# Patient Record
Sex: FEMALE | Race: WHITE | NOT HISPANIC OR LATINO | Employment: FULL TIME | URBAN - METROPOLITAN AREA
[De-identification: names, ages, dates, MRNs, and addresses within clinical notes are randomized per-mention and may not be internally consistent; named-entity substitution may affect disease eponyms.]

---

## 2022-02-15 ENCOUNTER — APPOINTMENT (EMERGENCY)
Dept: RADIOLOGY | Facility: HOSPITAL | Age: 37
End: 2022-02-15
Payer: COMMERCIAL

## 2022-02-15 ENCOUNTER — HOSPITAL ENCOUNTER (EMERGENCY)
Facility: HOSPITAL | Age: 37
Discharge: HOME | End: 2022-02-15
Attending: EMERGENCY MEDICINE
Payer: COMMERCIAL

## 2022-02-15 VITALS
DIASTOLIC BLOOD PRESSURE: 70 MMHG | HEIGHT: 61 IN | SYSTOLIC BLOOD PRESSURE: 112 MMHG | TEMPERATURE: 97.9 F | WEIGHT: 100 LBS | OXYGEN SATURATION: 100 % | RESPIRATION RATE: 16 BRPM | HEART RATE: 82 BPM | BODY MASS INDEX: 18.88 KG/M2

## 2022-02-15 DIAGNOSIS — S52.531A CLOSED COLLES' FRACTURE OF RIGHT RADIUS, INITIAL ENCOUNTER: ICD-10-CM

## 2022-02-15 DIAGNOSIS — S52.501A DISTAL RADIUS FRACTURE, RIGHT: Primary | ICD-10-CM

## 2022-02-15 PROCEDURE — 99283 EMERGENCY DEPT VISIT LOW MDM: CPT

## 2022-02-15 PROCEDURE — 73110 X-RAY EXAM OF WRIST: CPT

## 2022-02-15 PROCEDURE — 25605 CLTX DST RDL FX/EPHYS SEP W/: CPT | Performed by: EMERGENCY MEDICINE

## 2022-02-15 PROCEDURE — 96372 THER/PROPH/DIAG INJ SC/IM: CPT

## 2022-02-15 PROCEDURE — 99284 EMERGENCY DEPT VISIT MOD MDM: CPT | Performed by: EMERGENCY MEDICINE

## 2022-02-15 PROCEDURE — 73100 X-RAY EXAM OF WRIST: CPT

## 2022-02-15 RX ORDER — LIDOCAINE HYDROCHLORIDE 10 MG/ML
20 INJECTION, SOLUTION EPIDURAL; INFILTRATION; INTRACAUDAL; PERINEURAL ONCE
Status: COMPLETED | OUTPATIENT
Start: 2022-02-15 | End: 2022-02-15

## 2022-02-15 RX ORDER — KETOROLAC TROMETHAMINE 30 MG/ML
15 INJECTION, SOLUTION INTRAMUSCULAR; INTRAVENOUS ONCE
Status: COMPLETED | OUTPATIENT
Start: 2022-02-15 | End: 2022-02-15

## 2022-02-15 RX ORDER — OXYCODONE HYDROCHLORIDE 5 MG/1
5 TABLET ORAL ONCE
Status: COMPLETED | OUTPATIENT
Start: 2022-02-15 | End: 2022-02-15

## 2022-02-15 RX ORDER — ACETAMINOPHEN 325 MG/1
975 TABLET ORAL ONCE
Status: COMPLETED | OUTPATIENT
Start: 2022-02-15 | End: 2022-02-15

## 2022-02-15 RX ORDER — OXYCODONE HYDROCHLORIDE 5 MG/1
5 CAPSULE ORAL EVERY 4 HOURS PRN
Qty: 8 CAPSULE | Refills: 0 | Status: SHIPPED | OUTPATIENT
Start: 2022-02-15 | End: 2022-02-18

## 2022-02-15 RX ADMIN — LIDOCAINE HYDROCHLORIDE 20 ML: 10 INJECTION, SOLUTION EPIDURAL; INFILTRATION; INTRACAUDAL at 14:10

## 2022-02-15 RX ADMIN — OXYCODONE HYDROCHLORIDE 5 MG: 5 TABLET ORAL at 13:59

## 2022-02-15 RX ADMIN — KETOROLAC TROMETHAMINE 15 MG: 30 INJECTION, SOLUTION INTRAMUSCULAR at 14:00

## 2022-02-15 RX ADMIN — ACETAMINOPHEN 975 MG: 325 TABLET, FILM COATED ORAL at 13:59

## 2022-02-15 NOTE — ED PROVIDER NOTES
History  Chief Complaint   Patient presents with    Wrist Injury     Patient reports that she fell while ice skating today injuring her right wrist; area is red and swollen; denies head strike or LOC     32 yo LHD F w no pmhx presenting with right wrist pain x 2 hours  She was ice skating when she fell on out stretched hand  She had immediate pain and swelling  No headstrike, no LOC, no thinners  Pain is:  throbbing in character  Located left wrist  acute in onset  constant in duration  severe in intensity  Exacerbating factors movement of wrist  Remitting factors immobilization  Radiates up the arm  Associated symptoms: no numbness or tingling, no open wounds noted        History provided by:  Patient   used: No        None       History reviewed  No pertinent past medical history  Past Surgical History:   Procedure Laterality Date    GALLBLADDER SURGERY         History reviewed  No pertinent family history  I have reviewed and agree with the history as documented  E-Cigarette/Vaping    E-Cigarette Use Never User      E-Cigarette/Vaping Substances     Social History     Tobacco Use    Smoking status: Never Smoker    Smokeless tobacco: Never Used   Vaping Use    Vaping Use: Never used   Substance Use Topics    Alcohol use: Never    Drug use: Never        Review of Systems   Constitutional: Negative  HENT: Negative  Eyes: Negative  Respiratory: Negative  Cardiovascular: Negative  Gastrointestinal: Negative  Endocrine: Negative  Genitourinary: Negative  Musculoskeletal: Positive for joint swelling  Allergic/Immunologic: Negative  Neurological: Negative  Hematological: Negative  Psychiatric/Behavioral: Negative          Physical Exam  ED Triage Vitals [02/15/22 1307]   Temperature Pulse Respirations Blood Pressure SpO2   97 9 °F (36 6 °C) 82 16 112/70 100 %      Temp Source Heart Rate Source Patient Position - Orthostatic VS BP Location FiO2 (%) Tympanic Monitor Sitting Left arm --      Pain Score       10 - Worst Possible Pain             Orthostatic Vital Signs  Vitals:    02/15/22 1307   BP: 112/70   Pulse: 82   Patient Position - Orthostatic VS: Sitting       Physical Exam  Vitals reviewed  Constitutional:       Appearance: Normal appearance  HENT:      Head: Normocephalic and atraumatic  Right Ear: Tympanic membrane, ear canal and external ear normal       Left Ear: Tympanic membrane, ear canal and external ear normal       Nose: Nose normal       Mouth/Throat:      Mouth: Mucous membranes are moist       Pharynx: Oropharynx is clear  Eyes:      Conjunctiva/sclera: Conjunctivae normal       Pupils: Pupils are equal, round, and reactive to light  Cardiovascular:      Rate and Rhythm: Normal rate and regular rhythm  Pulses: Normal pulses  Heart sounds: Normal heart sounds  Pulmonary:      Effort: Pulmonary effort is normal       Breath sounds: Normal breath sounds  Abdominal:      General: Abdomen is flat  Palpations: Abdomen is soft  Musculoskeletal:      Right wrist: Swelling, deformity, tenderness and bony tenderness present  No effusion, lacerations, snuff box tenderness or crepitus  Decreased range of motion  Normal pulse  Cervical back: Normal range of motion and neck supple  Skin:     General: Skin is warm  Capillary Refill: Capillary refill takes less than 2 seconds  Neurological:      General: No focal deficit present  Mental Status: She is alert  Psychiatric:         Mood and Affect: Mood normal          Thought Content:  Thought content normal          ED Medications  Medications - No data to display    Diagnostic Studies  Results Reviewed     None                 XR wrist 3+ views RIGHT    (Results Pending)         Procedures  Procedure- Orthopedics   Heidi Mg 31 y o  female MRN: [id number]  Unit/Bed#: X ray    Procedure: Distal radius reduction and splint application    A hematoma block was given with 20cc of 1% lidocaine without epi  Once adequate anesthesia was obtained a gentle closed reduction maneuver was performed and pt was placed in a well padded sugartong splint  Pt tolerated the procedure well and was neurovascularly intact both pre and post procedure  Post reduction orthogonal x rays showed appropriate reduction of the distal radius with restoration of radial height and volar tilt  Splint application    Date/Time: 2/15/2022 2:10 PM  Performed by: Jessie Hubbard MD  Authorized by: Jessie Hubbard MD   Universal Protocol:  Consent: Verbal consent obtained  Risks and benefits: risks, benefits and alternatives were discussed  Consent given by: patient  Time out: Immediately prior to procedure a "time out" was called to verify the correct patient, procedure, equipment, support staff and site/side marked as required  Timeout called at: 2/15/2022 2:10 PM   Patient understanding: patient states understanding of the procedure being performed  Patient consent: the patient's understanding of the procedure matches consent given  Procedure consent: procedure consent matches procedure scheduled  Relevant documents: relevant documents present and verified  Test results: test results available and properly labeled  Site marked: the operative site was marked  Radiology Images displayed and confirmed  If images not available, report reviewed: imaging studies available  Patient identity confirmed: verbally with patient      Pre-procedure details:     Sensation:  Normal  Procedure details:     Laterality:  Right    Location:  Wrist    Wrist:  R wrist    Splint type:  Sugar tong    Supplies:  Cotton padding, plaster and elastic bandage          ED Course                                       MDM  Number of Diagnoses or Management Options  Distal radius fracture, right: minor  Diagnosis management comments: 32 yo F LHD Female with right distal radius extraarticular fracture   Reduced and splinted in ED  She will f/u in a week here  scrips for analgesics sent to her pharmacy  Amount and/or Complexity of Data Reviewed  Clinical lab tests: reviewed    Patient Progress  Patient progress: improved      Disposition  Final diagnoses:   None     ED Disposition     None      Follow-up Information    None         Patient's Medications    No medications on file     No discharge procedures on file  PDMP Review     None           ED Provider  Attending physically available and evaluated Khushbu Ty ALLEN managed the patient along with the ED Attending      Electronically Signed by         Giana Tran MD  02/15/22 6726

## 2022-02-15 NOTE — RESULT ENCOUNTER NOTE
Called patient and informed of ulnar fracture as well as the distal radius fracture  Pt plans on following up with her orthopedic doctors in her home town  Gave phone number for medical records to get copy of disc

## 2022-02-15 NOTE — ED ATTENDING ATTESTATION
Final Diagnosis:  1  Distal radius fracture, right    2  Closed Colles' fracture of right radius, initial encounter           I, Veronica Ríos MD, saw and evaluated the patient  All available labs and X-rays were ordered by me or the resident and have been reviewed by myself  I discussed the patient with the resident / non-physician and agree with the resident's / non-physician practitioner's findings and plan as documented in the resident's / non-physician practicitioner's note, except where noted  At this point, I agree with the current assessment done in the ED  I was present during key portions of all procedures performed unless otherwise stated  Chief Complaint   Patient presents with    Wrist Injury     Patient reports that she fell while ice skating today injuring her right wrist; area is red and swollen; denies head strike or LOC     This is a 31 y o  female presenting for evaluation of radial fracture  The patient states that she was ice skating today when she fell on her outstretched hand, she is having severe radial head tenderness since then  No numbness or tingling distally  No head strike, remembers all the events  No medications tried prior to arrival    PMH:   has no past medical history on file  PSH:   has a past surgical history that includes Gallbladder surgery  Social:  Social History     Substance and Sexual Activity   Alcohol Use Never     Social History     Tobacco Use   Smoking Status Never Smoker   Smokeless Tobacco Never Used     Social History     Substance and Sexual Activity   Drug Use Never     PE:  Vitals:    02/15/22 1307   BP: 112/70   BP Location: Left arm   Pulse: 82   Resp: 16   Temp: 97 9 °F (36 6 °C)   TempSrc: Tympanic   SpO2: 100%   Weight: 45 4 kg (100 lb)   Height: 5' 1" (1 549 m)   General: VS reviewed  Appears in NAD  awake, alert  Well-nourished, well-developed  Appears stated age  Speaking normally in full sentences     Head: Normocephalic, atraumatic  Eyes: EOM-I  No diplopia  No hyphema  No subconjunctival hemorrhages  Symmetrical lids  ENT: Atraumatic external nose and ears  MMM  No malocclusion  No stridor  Normal phonation  No drooling  Normal swallowing  Neck: No JVD  CV: No pallor noted  Lungs:   No tachypnea  No respiratory distress  MSK:   FROM spontaneously except no RIGHT wrist flexion/extension  M/U/R/A nerve sensation intact   strength 5/5  Finger abduction/adduction/opposition intact  Suppination/Pronation intact without reproduction of pain  Able to 1+2 and 1+5 finger appose  Able to 2+3 finger cross  Not focally tender scaphoid tenderness but more so radius   No snuff box tenderness  Good capillary refill  2+ radial pulses, bilaterally equal    Refused wrist movements  BICEPS/TRICEPS 5/5  Shoulder abduction/adduction 5/5  Skin: Dry, intact  Neuro: Awake, alert, GCS15, CN II-XII grossly intact  Motor grossly intact  Psychiatric/Behavioral: Appropriate mood and affect   Exam: deferred  A:  - Wrist fracture, high suspicion  P:  - XR  - splint  - f/u ortho    - 13 point ROS was performed and all are normal unless stated in the history above  - Nursing note reviewed  Vitals reviewed  - Orders placed by myself and/or advanced practitioner / resident     - Previous chart was reviewed  - No language barrier    - History obtained from patient  - There are no limitations to the history obtained  - Critical care time: Not applicable for this patient       Code Status: No Order  Advance Directive and Living Will:      Power of :    POLST:      Medications   lidocaine (PF) (XYLOCAINE-MPF) 1 % injection 20 mL (20 mL Infiltration Given by Other 2/15/22 1410)   acetaminophen (TYLENOL) tablet 975 mg (975 mg Oral Given 2/15/22 1359)   ketorolac (TORADOL) injection 15 mg (15 mg Intramuscular Given 2/15/22 1400)   oxyCODONE (ROXICODONE) IR tablet 5 mg (5 mg Oral Given 2/15/22 1359)     XR wrist 3+ vw right   Final Result      Minimally displaced and impacted distal radius fracture status post reduction  No definitive intra-articular extension identified by radiographs  Nondisplaced ulnar styloid fracture  Workstation performed: [id number]         XR wrist 2 vw right   Final Result      Minimally displaced and impacted distal radius fracture status post reduction  No definitive intra-articular extension identified by radiographs  Nondisplaced ulnar styloid fracture  Workstation performed: [id number]         XR wrist 3+ views RIGHT   ED Interpretation   Abnormal   Radial head fx, minimally displaced  Final Result      Minimally displaced and impacted distal radius fracture status post reduction  No definitive intra-articular extension identified by radiographs  Nondisplaced ulnar styloid fracture  Workstation performed: [id number]           Orders Placed This Encounter   Procedures    Splint application    XR wrist 3+ views RIGHT    XR wrist 3+ vw right    XR wrist 2 vw right     Labs Reviewed - No data to display  Time reflects when diagnosis was documented in both MDM as applicable and the Disposition within this note       Time User Action Codes Description Comment    2/15/2022  2:03 PM Chrissie Rizo [[id number]] Distal radius fracture, right     2/15/2022  2:24 PM Dino Tran [[id number]] Closed Colles' fracture of right radius, initial encounter           ED Disposition       ED Disposition Condition Date/Time Comment    Discharge Stable Tue Feb 15, 2022  2:23 PM Julio César Armenta discharge to home/self care                  Follow-up Information       Follow up With Specialties Details Why Contact Info Additional [address] Emergency Department Emergency Medicine  If symptoms worsen [address] [place] [address] Emergency Department, [facility] [address]    Jeniffer Cabrera MD Orthopedic Surgery, Hand Surgery Schedule an appointment as soon as possible for a visit in 1 week  [address]  [phone]             Discharge Medication List as of 2/15/2022  3:17 PM        START taking these medications    Details   oxyCODONE (OXY-IR) 5 MG capsule Take 1 capsule (5 mg total) by mouth every 4 (four) hours as needed for moderate pain for up to 3 days Max Daily Amount: 30 mg, Starting Tue 2/15/2022, Until Fri 2/18/2022 at 2359, Print           No discharge procedures on file  None       Portions of the record may have been created with voice recognition software  Occasional wrong word or "sound a like" substitutions may have occurred due to the inherent limitations of voice recognition software  Read the chart carefully and recognize, using context, where substitutions have occurred      Electronically signed by:  Emily Morgan